# Patient Record
Sex: MALE | Race: OTHER
[De-identification: names, ages, dates, MRNs, and addresses within clinical notes are randomized per-mention and may not be internally consistent; named-entity substitution may affect disease eponyms.]

---

## 2022-12-26 ENCOUNTER — HOSPITAL ENCOUNTER (INPATIENT)
Dept: HOSPITAL 12 - ER | Age: 42
LOS: 1 days | Discharge: HOME | DRG: 48 | End: 2022-12-27
Attending: INTERNAL MEDICINE
Payer: MEDICAID

## 2022-12-26 VITALS — BODY MASS INDEX: 22.2 KG/M2 | HEIGHT: 64 IN | WEIGHT: 130 LBS

## 2022-12-26 VITALS — SYSTOLIC BLOOD PRESSURE: 136 MMHG | DIASTOLIC BLOOD PRESSURE: 90 MMHG

## 2022-12-26 DIAGNOSIS — E11.65: ICD-10-CM

## 2022-12-26 DIAGNOSIS — E80.4: ICD-10-CM

## 2022-12-26 DIAGNOSIS — G51.0: Primary | ICD-10-CM

## 2022-12-26 DIAGNOSIS — I45.10: ICD-10-CM

## 2022-12-26 DIAGNOSIS — Z20.822: ICD-10-CM

## 2022-12-26 DIAGNOSIS — E86.0: ICD-10-CM

## 2022-12-26 DIAGNOSIS — Z79.84: ICD-10-CM

## 2022-12-26 DIAGNOSIS — E78.5: ICD-10-CM

## 2022-12-26 LAB
BUN SERPL-MCNC: 23 MG/DL (ref 7–18)
CHLORIDE SERPL-SCNC: 102 MMOL/L (ref 98–107)
CO2 SERPL-SCNC: 27 MMOL/L (ref 21–32)
CREAT SERPL-MCNC: 1 MG/DL (ref 0.6–1.3)
GLUCOSE SERPL-MCNC: 201 MG/DL (ref 74–106)
HCT VFR BLD AUTO: 42 % (ref 36.7–47.1)
MCH RBC QN AUTO: 30.1 UUG (ref 23.8–33.4)
MCV RBC AUTO: 85.2 FL (ref 73–96.2)
PLATELET # BLD AUTO: 285 K/UL (ref 152–348)
POTASSIUM SERPL-SCNC: 3.7 MMOL/L (ref 3.5–5.1)

## 2022-12-26 PROCEDURE — G0378 HOSPITAL OBSERVATION PER HR: HCPCS

## 2022-12-26 PROCEDURE — A4663 DIALYSIS BLOOD PRESSURE CUFF: HCPCS

## 2022-12-26 NOTE — NUR
Patient has in his possession 1 cellphone, 1 credit card, two $1.00, three 
$5.00, one $10.00, seven $20.00 and  twelve $50.00. Patient refused tgo have 
valuable in the safe at this time.

## 2022-12-26 NOTE — NUR
-------------------------------------------------------------------------------

          *** Note undone in [facility] - 12/26/22 at 2043 by [id number] ***           

-------------------------------------------------------------------------------

Called Dr nichole for room  assignment. Waiting for charge to to call back with 
room assignment.

## 2022-12-27 VITALS — SYSTOLIC BLOOD PRESSURE: 147 MMHG | DIASTOLIC BLOOD PRESSURE: 91 MMHG

## 2022-12-27 VITALS — SYSTOLIC BLOOD PRESSURE: 130 MMHG | DIASTOLIC BLOOD PRESSURE: 88 MMHG

## 2022-12-27 VITALS — DIASTOLIC BLOOD PRESSURE: 93 MMHG | SYSTOLIC BLOOD PRESSURE: 132 MMHG

## 2022-12-27 VITALS — SYSTOLIC BLOOD PRESSURE: 130 MMHG | DIASTOLIC BLOOD PRESSURE: 86 MMHG

## 2022-12-27 LAB
ALP SERPL-CCNC: 65 U/L (ref 50–136)
ALT SERPL W/O P-5'-P-CCNC: 22 U/L (ref 16–63)
AST SERPL-CCNC: 10 U/L (ref 15–37)
BILIRUB SERPL-MCNC: 1.6 MG/DL (ref 0.2–1)
BUN SERPL-MCNC: 19 MG/DL (ref 7–18)
CHLORIDE SERPL-SCNC: 103 MMOL/L (ref 98–107)
CHOLEST SERPL-MCNC: 245 MG/DL (ref ?–200)
CO2 SERPL-SCNC: 29 MMOL/L (ref 21–32)
CREAT SERPL-MCNC: 0.9 MG/DL (ref 0.6–1.3)
GLUCOSE SERPL-MCNC: 213 MG/DL (ref 74–106)
HCT VFR BLD AUTO: 39.3 % (ref 36.7–47.1)
HDLC SERPL-MCNC: 50 MG/DL (ref 40–60)
MAGNESIUM SERPL-MCNC: 2 MG/DL (ref 1.8–2.4)
MCH RBC QN AUTO: 29.9 UUG (ref 23.8–33.4)
MCV RBC AUTO: 85.6 FL (ref 73–96.2)
PHOSPHATE SERPL-MCNC: 3.9 MG/DL (ref 2.5–4.9)
PLATELET # BLD AUTO: 266 K/UL (ref 152–348)
POTASSIUM SERPL-SCNC: 3.8 MMOL/L (ref 3.5–5.1)
TRIGL SERPL-MCNC: 235 MG/DL (ref 30–150)
WS STN SPEC: 7.3 G/DL (ref 6.4–8.2)

## 2022-12-27 RX ADMIN — Medication SCH EACH: at 16:47

## 2022-12-27 RX ADMIN — SODIUM CHLORIDE PRN UNIT: 9 INJECTION, SOLUTION INTRAVENOUS at 13:36

## 2022-12-27 RX ADMIN — SODIUM CHLORIDE PRN UNIT: 9 INJECTION, SOLUTION INTRAVENOUS at 16:50

## 2022-12-27 RX ADMIN — Medication SCH EACH: at 13:35

## 2022-12-27 RX ADMIN — SODIUM CHLORIDE PRN UNIT: 9 INJECTION, SOLUTION INTRAVENOUS at 08:04

## 2022-12-27 RX ADMIN — Medication SCH EACH: at 07:12

## 2022-12-27 NOTE — NUR
PATIENT DISCHARGED PICKED UP BY HIS FRIEND IN SATISFACTORY CONDITION STILL HAS PTOSIS 
INSTRUCTED TO FOLLOW UP WITH AN EYE DOCTOR AND HE EXPRESSED UNDERSTANDING.

## 2022-12-27 NOTE — NUR
SHIFT NOTE; RECEIVED PATIENT ALERT AND ORIENTED X4 REPORT GIVEN BY RUFUS IN THE ER PT 
HAVING PAIN FOR PAST THREE DAYS IN LEFT EYE. ASSESSED EYE LID IS CLOSE PT HAS MRI WITH AND 
WITHOUT CONTRAST ORDERED PROCEDURE EXPLAINED TO PATIENT AND ANSWER WHAT WAS ON FORM.  MRI 
FORMS SIGNED BY PATIENT AND WITNESS BY NURSE.  PT ADMITTING DX IS BELL PALSY BUT DR BAILEY  
NEUROLGIST  WAS CALLED WHILE PATIENT WAS IN THE ER HE WILL SEE PT  AND ORDER MRI TO R/O CVA. 
  CAT SCAN AND CTA HEAD AND NECK NEGATIVE.  WILL CONTINUE TO MONITOR FOR SAFETY.ALONG WITH 
ENDORSING TO AM NURSE.

## 2022-12-27 NOTE — NUR
PATIENT RETURNED BACK TO HIS ROOM BLOOD SUGAR  WITH SLIDING SCALE COVERAGE AS ORDERED 
TELE IS SR LEFT AYE STILL PUFFY AND PARTIALLY CLOSED NOT IN DISTRESS AT THIS TIME